# Patient Record
Sex: FEMALE | URBAN - METROPOLITAN AREA
[De-identification: names, ages, dates, MRNs, and addresses within clinical notes are randomized per-mention and may not be internally consistent; named-entity substitution may affect disease eponyms.]

---

## 2017-08-31 ENCOUNTER — TRANSFERRED RECORDS (OUTPATIENT)
Dept: HEALTH INFORMATION MANAGEMENT | Facility: CLINIC | Age: 32
End: 2017-08-31

## 2017-08-31 ENCOUNTER — MEDICAL CORRESPONDENCE (OUTPATIENT)
Dept: HEALTH INFORMATION MANAGEMENT | Facility: CLINIC | Age: 32
End: 2017-08-31

## 2017-10-04 ENCOUNTER — OFFICE VISIT (OUTPATIENT)
Dept: OBGYN | Facility: CLINIC | Age: 32
End: 2017-10-04
Attending: OBSTETRICS & GYNECOLOGY
Payer: COMMERCIAL

## 2017-10-04 VITALS — HEART RATE: 77 BPM | SYSTOLIC BLOOD PRESSURE: 93 MMHG | DIASTOLIC BLOOD PRESSURE: 59 MMHG | WEIGHT: 134.8 LBS

## 2017-10-04 DIAGNOSIS — N97.9 FEMALE INFERTILITY: Primary | ICD-10-CM

## 2017-10-04 NOTE — MR AVS SNAPSHOT
After Visit Summary   10/4/2017    Penny Engel    MRN: [id number]           Patient Information     Date Of Birth          1985        Visit Information        Provider Department      10/4/2017 1:15 PM Dorie Tabor MD [facility]        Today's Diagnoses     Female infertility    -  1      Care Instructions    1. Go to the lab on Monday 10/10 or Tuesday 10/11 to have your blood drawn. This is the progesterone test to check for ovulation.  2. Call the clinic on the first day of your next period to schedule the HSG test (test to evaluate the fallopian tubes and uterus)  3. Go to the lab on day 3-5 of your next period (the first day of bleeding is day 1) for the rest of the lab tests.  4. Have your partner complete a semen analysis- can be done through his doctor, or you can call us with his information and we can order it  5. Schedule return visit after all testing to go through results              Follow-ups after your visit        Future tests that were ordered for you today     Open Future Orders        Priority Expected Expires Ordered    Progesterone Routine  10/4/2018 10/4/2017    Follicle Stimulating Hormone Routine  10/4/2018 10/4/2017    Estradiol Routine  10/4/2018 10/4/2017    Anti-Mullerian hormone Routine  10/4/2018 10/4/2017    XR Hysterosalpingogram Routine 10/4/2017 10/4/2018 10/4/2017            Who to contact     Please call your clinic at [phone] to:    Ask questions about your health    Make or cancel appointments    Discuss your medicines    Learn about your test results    Speak to your doctor   If you have compliments or concerns about an experience at your clinic, or if you wish to file a complaint, please contact [facility] Patient Relations at [phone] or email us at [email or web address]         Additional Information About Your Visit        MyChart Information     Cuco is an  electronic gateway that provides easy, online access to your medical records. With Perfectore, you can request a clinic appointment, read your test results, renew a prescription or communicate with your care team.     To sign up for Perfectore visit the website at [email or web address]   You will be asked to enter the access code listed below, as well as some personal information. Please follow the directions to create your username and password.     Your access code is: [email or web address]  Expires: 2017  6:30 AM     Your access code will  in 90 days. If you need help or a new code, please contact your [facility] or call [phone] for assistance.        Care EveryWhere ID     This is your Care EveryWhere ID. This could be used by other organizations to access your [facility] medical records  [id number]        Your Vitals Were     Pulse Last Period                77 2017 (Exact Date)           Blood Pressure from Last 3 Encounters:   10/04/17 93/59    Weight from Last 3 Encounters:   10/04/17 61.1 kg (134 lb 12.8 oz)               Primary Care Provider    None Specified       No primary provider on file.        Equal Access to Services     [facility] HospitalAVELINO : Hadii az Ritchie, wapanchoda james, qaybta kaalmada jadyn, travis whaley . So [facility] [phone].    ATENCIÓN: Si habla español, tiene a szymanski disposición servicios gratuitos de asistencia lingüística. Llame al [phone].    We comply with applicable federal civil rights laws and [place] laws. We do not discriminate on the basis of race, color, national origin, age, disability, sex, sexual orientation, or gender identity.            Thank you!     Thank you for choosing [facility]  for your care. Our goal is always to provide you with excellent care. Hearing back from our patients is one way we can continue to improve our services. Please take a few minutes  to complete the written survey that you may receive in the mail after your visit with us. Thank you!             Your Updated Medication List - Protect others around you: Learn how to safely use, store and throw away your medicines at www.disposemymeds.org.      Notice  As of 10/4/2017  2:09 PM    You have not been prescribed any medications.

## 2017-10-04 NOTE — LETTER
10/4/2017       RE: Penny Engel  [address] [facility] [place] [address]     Dear Colleague,    Thank you for referring your patient, Penny Engel, to the [facility] at [facility]. Please see a copy of my visit note below.    [facility]     CC: Infertility    Subjective:  Penny Engel is a 32 year old G0 who presents to establish care for infertility. She has been trying to conceive with her partner for 2 years. She has been using a phone daly to track her periods and intercourse. She has not been using ovulation predictor kits. She has never been pregnant before, but is generally healthy with no other medical concerns. Her partner is healthy and does not have any children. Menstrual history is notable for 3 consecutive longer than normal cycles in 2016  (December-March, March-June, June-August), but has otherwise been regular every 30 days since menses, with no abnormal or concerning bleeding. Her cycle returning to regularity did not correspond with weight loss. PMH is notable for chlamydia infection in 2012 for which she received treatment. Family history is notable for 2 aunts with infertility, but no specific diagnosis known.    Contraception: none   Last Pap: 8/2017 at [facility], normal per patient     Menstrual History  Menses: age 11  LMP 9/18/2017  Regular every 30 days, 5 days of bleeding (1-2 days heavy bleeding 3-4 pads/day, not totally soaked)  PMS: cramps, headache, back pain, 1st day pretty severe and limits activity    GYN History  Infections: Chlamydia in 2012 - received treatment and has had no complications, and no history of PID  Contraception: tried the patch in 2004 or 2005 for 2 months, but did not like the hassle and has not been on other contraception since.  Surgeries: none    PMH  No history of thyroid, pituitary, or other endocrine disease. No diabetes. No  HTN.    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 She is generally healthy with an unremarkable medical history and no obvious cause of infertility, aside from a history of chlamydia. Differential at this point is quite broad and includes anovulatory disorders vs tube-associated infertility vs endocrine disorders vs uterine structural abnormalities vs male factor. Initial lab work-up will be initiated to tease out a possible etiology.    - Progesterone on cycle day 21  - FSH, estradiol on cycle day 3-5; AMH   - TSH with free T4  - XR Hysterosalpingogram at beginning of next cycle- pt to call clinic with next period to schedule  - Semen analysis for partner- explained we can perform this at [facility] lab if desired   - Start pre-edin vitamin    Follow up: In [facility] clinic when tests are completed.     I, Leland Chavis, MS3, am acting as the scribe for Dorie Tabor MD. All aspects of the exam and documentation were approved by the attending physician.    The above patient was seen and evaluated by me in conjunction with the medical student, who acted as my scribe for the above note. The documentation recorded by the scribe accurately reflects the services I personally performed and the decisions made by me.      Again, thank you for allowing me to participate in the care of your patient.      Sincerely,    Dorie Tabor MD

## 2017-10-04 NOTE — PATIENT INSTRUCTIONS
1. Go to the lab on Monday 10/10 or Tuesday 10/11 to have your blood drawn. This is the progesterone test to check for ovulation.  2. Call the clinic on the first day of your next period to schedule the HSG test (test to evaluate the fallopian tubes and uterus)  3. Go to the lab on day 3-5 of your next period (the first day of bleeding is day 1) for the rest of the lab tests.  4. Have your partner complete a semen analysis- can be done through his doctor, or you can call us with his information and we can order it  5. Schedule return visit after all testing to go through results

## 2017-10-04 NOTE — PROGRESS NOTES
[facility]     CC: Infertility    Subjective:  Penny Engel is a 32 year old G0 who presents to establish care for infertility. She has been trying to conceive with her partner for 2 years. She has been using a phone daly to track her periods and intercourse. She has not been using ovulation predictor kits. She has never been pregnant before, but is generally healthy with no other medical concerns. Her partner is healthy and does not have any children. Menstrual history is notable for 3 consecutive longer than normal cycles in 2016  (December-March, March-June, June-August), but has otherwise been regular every 30 days since menses, with no abnormal or concerning bleeding. Her cycle returning to regularity did not correspond with weight loss. PMH is notable for chlamydia infection in 2012 for which she received treatment. Family history is notable for 2 aunts with infertility, but no specific diagnosis known.    Contraception: none   Last Pap: 8/2017 at [facility], normal per patient     Menstrual History  Menses: age 11  LMP 9/18/2017  Regular every 30 days, 5 days of bleeding (1-2 days heavy bleeding 3-4 pads/day, not totally soaked)  PMS: cramps, headache, back pain, 1st day pretty severe and limits activity    GYN History  Infections: Chlamydia in 2012 - received treatment and has had no complications, and no history of PID  Contraception: tried the patch in 2004 or 2005 for 2 months, but did not like the hassle and has not been on other contraception since.  Surgeries: none    PMH  No history of thyroid, pituitary, or other endocrine disease. No diabetes. No HTN.    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 She is generally healthy with an unremarkable medical history and no obvious cause of infertility, aside from a history of chlamydia. Differential at this point is quite broad and includes anovulatory disorders vs tube-associated infertility vs endocrine disorders vs uterine structural abnormalities vs male factor. Initial lab work-up will be initiated to tease out a possible etiology.    - Progesterone on cycle day 21  - FSH, estradiol on cycle day 3-5; AMH   - TSH with free T4  - XR Hysterosalpingogram at beginning of next cycle- pt to call clinic with next period to schedule  - Semen analysis for partner- explained we can perform this at [facility] lab if desired   - Start pre-edin vitamin    Follow up: In [facility] clinic when tests are completed.     I, Leland Chavis, MS3, am acting as the scribe for Dorie Tabor MD. All aspects of the exam and documentation were approved by the attending physician.    The above patient was seen and evaluated by me in conjunction with the medical student, who acted as my scribe for the above note. The documentation recorded by the scribe accurately reflects the services I personally performed and the decisions made by me.    Dorie Tabor MD

## 2018-03-14 ENCOUNTER — HOSPITAL ENCOUNTER (OUTPATIENT)
Dept: GENERAL RADIOLOGY | Facility: CLINIC | Age: 33
Discharge: HOME | End: 2018-03-14
Attending: OBSTETRICS & GYNECOLOGY | Admitting: OBSTETRICS & GYNECOLOGY
Payer: COMMERCIAL

## 2018-03-14 ENCOUNTER — ALLIED HEALTH/NURSE VISIT (OUTPATIENT)
Dept: OBGYN | Facility: CLINIC | Age: 33
End: 2018-03-14
Payer: COMMERCIAL

## 2018-03-14 DIAGNOSIS — Z01.812 PRE-PROCEDURE LAB EXAM: Primary | ICD-10-CM

## 2018-03-14 DIAGNOSIS — N97.9 FEMALE INFERTILITY: ICD-10-CM

## 2018-03-14 LAB
HCG UR QL: NEGATIVE
INTERNAL QC OK POCT: YES

## 2018-03-14 PROCEDURE — 81025 URINE PREGNANCY TEST: CPT | Mod: ZF

## 2018-03-14 PROCEDURE — 58340 CATHETER FOR HYSTEROGRAPHY: CPT

## 2018-03-14 PROCEDURE — G0463 HOSPITAL OUTPT CLINIC VISIT: HCPCS | Mod: ZF

## 2018-03-14 PROCEDURE — 25000125 ZZHC RX 250: Performed by: OBSTETRICS & GYNECOLOGY

## 2018-03-14 PROCEDURE — 25500064 ZZH RX 255 OP 636: Performed by: OBSTETRICS & GYNECOLOGY

## 2018-03-14 PROCEDURE — 40000809 ZZH STATISTIC NO DOCUMENTATION TO SUPPORT CHARGE

## 2018-03-14 RX ORDER — IOPAMIDOL 510 MG/ML
50 INJECTION, SOLUTION INTRAVASCULAR ONCE
Status: COMPLETED | OUTPATIENT
Start: 2018-03-14 | End: 2018-03-14

## 2018-03-14 RX ADMIN — LIDOCAINE HYDROCHLORIDE 2 ML: 10 INJECTION, SOLUTION EPIDURAL; INFILTRATION; INTRACAUDAL; PERINEURAL at 10:40

## 2018-03-14 RX ADMIN — IOPAMIDOL 15 ML: 510 INJECTION, SOLUTION INTRAVASCULAR at 10:40

## 2018-03-14 NOTE — PROCEDURES
HSG Procedure    Indications: Assess tubal patency    Pre procedure Diagnosis: fertility testing  Post Procedure Diagnosis: Same    UPT: neg    Procedure: hysterosalpingogram  Anesthesia: 1 % lidocaine  EBL: minimal  Total Omnipaque:  cc    Procedure: With her permission, after explaining the procedure, the patient was placed in the dorsal lithotomy position.  A medium Graves speculum was inserted.  The cervix was visualized and cleansed with hibicleanse solution x 3.  1 cc 1% lidocaine was injected at 12 o' clock. The catheter was inserted to the internal os.  Because catheter passed easily, no tenaculum was placed.     HSG was completed with findings of normal shaped cavity and bilateral tubal fill and spill.      See radiology report for details.    All instruments were removed from the cervix.   Hemostasis attained with pressure .    Sherice Urban MD, FACOG  [facility]  OB/GYN    3/14/2018  10:35 AM

## 2018-03-21 ENCOUNTER — OFFICE VISIT (OUTPATIENT)
Dept: OBGYN | Facility: CLINIC | Age: 33
End: 2018-03-21
Attending: OBSTETRICS & GYNECOLOGY
Payer: COMMERCIAL

## 2018-03-21 VITALS
DIASTOLIC BLOOD PRESSURE: 63 MMHG | HEIGHT: 62 IN | BODY MASS INDEX: 23.92 KG/M2 | WEIGHT: 130 LBS | SYSTOLIC BLOOD PRESSURE: 101 MMHG | HEART RATE: 75 BPM

## 2018-03-21 DIAGNOSIS — Z31.41 FERTILITY TESTING: Primary | ICD-10-CM

## 2018-03-21 PROCEDURE — G0463 HOSPITAL OUTPT CLINIC VISIT: HCPCS | Mod: ZF

## 2018-03-21 RX ORDER — VITAMIN A ACETATE, BETA CAROTENE, ASCORBIC ACID, CHOLECALCIFEROL, .ALPHA.-TOCOPHEROL ACETATE, DL-, THIAMINE MONONITRATE, RIBOFLAVIN, NIACINAMIDE, PYRIDOXINE HYDROCHLORIDE, FOLIC ACID, CYANOCOBALAMIN, CALCIUM CARBONATE, FERROUS FUMARATE, ZINC OXIDE, CUPRIC OXIDE 3080; 12; 120; 400; 1; 1.84; 3; 20; 22; 920; 25; 200; 27; 10; 2 [IU]/1; UG/1; MG/1; [IU]/1; MG/1; MG/1; MG/1; MG/1; MG/1; [IU]/1; MG/1; MG/1; MG/1; MG/1; MG/1
TABLET, FILM COATED ORAL
COMMUNITY
Start: 2018-03-12

## 2018-03-21 RX ORDER — METRONIDAZOLE 500 MG/1
500 TABLET ORAL 2 TIMES DAILY
COMMUNITY

## 2018-03-21 ASSESSMENT — PAIN SCALES - GENERAL: PAINLEVEL: NO PAIN (0)

## 2018-03-21 NOTE — PATIENT INSTRUCTIONS
1. Go to the lab on day 21 (3/27 or 3/28) to have ONLY the progesterone test drawn  2. Go to the lab on day 3, 4 or 5 of your next cycle for the remainder of the labs. Day 1 is the first day of regular bleeding.

## 2018-03-21 NOTE — PROGRESS NOTES
"[facility] Return Visit    S: Penny Engel is a 32 year old G0 who presents to follow up infertility testing. She was last seen 10/2017, at which point she reported inability to conceive despite 2 years regular unprotected intercourse. She has not been tracking ovulation for the past few months as it causes her stress.     Since last visit, she had an HSG test which was normal. She has not had lab testing done yet. Her periods continue to be regular overall, though in Jan it was 2 weeks late.     O:    Vitals:    03/21/18 1503   BP: 101/63   Pulse: 75   Weight: 59 kg (130 lb)   Height: 1.562 m (5' 1.5\")     Gen: alert, oriented, NAD    A/P:  32 year old with primary infertility of 2+ years duration here to f/up.    - Reviewed normal HSG  - Explained again timing of labs- day 3 and day 21 progesterone. Patient states her LMP was 3/6 and she will have labs drawn this month.  - Partner has not yet had SA, but is open to it. Of note he has fathered a child previously.  - List of fertility clinics given today. I will call pt after labs back; if all normal would recommend establishing with fertility clinic.    Dorie Tabor MD      "

## 2018-03-21 NOTE — LETTER
"3/21/2018       RE: Pneny Engel  [address] [facility] [place] [address]     Dear Colleague,    Thank you for referring your patient, Penny Engel, to the [facility] at [facility]. Please see a copy of my visit note below.    S Return Visit    S: Penny Engel is a 32 year old G0 who presents to follow up infertility testing. She was last seen 10/2017, at which point she reported inability to conceive despite 2 years regular unprotected intercourse. She has not been tracking ovulation for the past few months as it causes her stress.     Since last visit, she had an HSG test which was normal. She has not had lab testing done yet. Her periods continue to be regular overall, though in Jan it was 2 weeks late.     O:    Vitals:    03/21/18 1503   BP: 101/63   Pulse: 75   Weight: 59 kg (130 lb)   Height: 1.562 m (5' 1.5\")     Gen: alert, oriented, NAD    A/P:  32 year old with primary infertility of 2+ years duration here to f/up.    - Reviewed normal HSG  - Explained again timing of labs- day 3 and day 21 progesterone. Patient states her LMP was 3/6 and she will have labs drawn this month.  - Partner has not yet had SA, but is open to it. Of note he has fathered a child previously.  - List of fertility clinics given today. I will call pt after labs back; if all normal would recommend establishing with fertility clinic.      Again, thank you for allowing me to participate in the care of your patient.      Sincerely,    Dorie Tabor MD      "

## 2018-03-27 DIAGNOSIS — N97.9 FEMALE INFERTILITY: ICD-10-CM

## 2018-03-27 LAB
ESTRADIOL SERPL-MCNC: 152 PG/ML
FSH SERPL-ACNC: 3.3 IU/L
PROGEST SERPL-MCNC: 15.1 NG/ML
TSH SERPL DL<=0.005 MIU/L-ACNC: 1.67 MU/L (ref 0.4–4)

## 2018-03-27 PROCEDURE — 82670 ASSAY OF TOTAL ESTRADIOL: CPT | Performed by: OBSTETRICS & GYNECOLOGY

## 2018-03-27 PROCEDURE — 83001 ASSAY OF GONADOTROPIN (FSH): CPT | Performed by: OBSTETRICS & GYNECOLOGY

## 2018-03-27 PROCEDURE — 84144 ASSAY OF PROGESTERONE: CPT | Performed by: OBSTETRICS & GYNECOLOGY

## 2018-03-27 PROCEDURE — 83520 IMMUNOASSAY QUANT NOS NONAB: CPT | Performed by: OBSTETRICS & GYNECOLOGY

## 2018-03-27 PROCEDURE — 84443 ASSAY THYROID STIM HORMONE: CPT | Performed by: OBSTETRICS & GYNECOLOGY

## 2018-03-27 PROCEDURE — 36415 COLL VENOUS BLD VENIPUNCTURE: CPT | Performed by: OBSTETRICS & GYNECOLOGY

## 2018-03-29 LAB — MIS SERPL-MCNC: 6.88 NG/ML (ref 0.18–11.71)

## 2018-11-17 NOTE — MR AVS SNAPSHOT
"              After Visit Summary   3/21/2018    Penny Engel    MRN: [id number]           Patient Information     Date Of Birth          1985        Visit Information        Provider Department      3/21/2018 2:45 PM Dorie Tabor MD [facility]        Care Instructions    1. Go to the lab on day 21 (3/27 or 3/28) to have ONLY the progesterone test drawn  2. Go to the lab on day 3, 4 or 5 of your next cycle for the remainder of the labs. Day 1 is the first day of regular bleeding.           Follow-ups after your visit        Who to contact     Please call your clinic at [phone] to:    Ask questions about your health    Make or cancel appointments    Discuss your medicines    Learn about your test results    Speak to your doctor            Additional Information About Your Visit        MyChart Information     [email or web address] is an electronic gateway that provides easy, online access to your medical records. With [email or web address], you can request a clinic appointment, read your test results, renew a prescription or communicate with your care team.     To sign up for [email or web address] visit the website at [email or web address]   You will be asked to enter the access code listed below, as well as some personal information. Please follow the directions to create your username and password.     Your access code is: [email or web address]  Expires: 2018  6:30 AM     Your access code will  in 90 days. If you need help or a new code, please contact your [facility] or call [phone] for assistance.        Care EveryWhere ID     This is your Care EveryWhere ID. This could be used by other organizations to access your [place] medical records  [id number]        Your Vitals Were     Pulse Height BMI (Body Mass Index)             75 1.562 m (5' 1.5\") 24.17 kg/m2          Blood Pressure from Last 3 Encounters:   18 101/63   10/04/17 93/59    Weight from Last " 3 Encounters:   03/21/18 59 kg (130 lb)   10/04/17 61.1 kg (134 lb 12.8 oz)              Today, you had the following     No orders found for display       Primary Care Provider    None Specified       No primary provider on file.        Equal Access to Services     NIRAV RANGEL : Hadii aad ku hadelanazuly [place], wapanchoda luqadaha, qajamesta kaalmada craigmalini, waxdanita juany schillingcristyjessenia echavarria. So [facility] [phone].    ATENCIÓN: Si habla español, tiene a szymanski disposición servicios gratuitos de asistencia lingüística. Llame al [phone].    We comply with applicable federal civil rights laws and [place] laws. We do not discriminate on the basis of race, color, national origin, age, disability, sex, sexual orientation, or gender identity.            Thank you!     Thank you for choosing [facility]  for your care. Our goal is always to provide you with excellent care. Hearing back from our patients is one way we can continue to improve our services. Please take a few minutes to complete the written survey that you may receive in the mail after your visit with us. Thank you!             Your Updated Medication List - Protect others around you: Learn how to safely use, store and throw away your medicines at www.disposemymeds.org.          This list is accurate as of 3/21/18  3:21 PM.  Always use your most recent med list.                   Brand Name Dispense Instructions for use Diagnosis    metroNIDAZOLE 500 MG tablet    FLAGYL     Take 500 mg by mouth 2 times daily        PRENATAL PLUS 27-1 MG Tabs              91

## 2023-04-25 ENCOUNTER — APPOINTMENT (OUTPATIENT)
Dept: URBAN - METROPOLITAN AREA CLINIC 254 | Age: 38
Setting detail: DERMATOLOGY
End: 2023-04-26

## 2023-04-25 VITALS — HEIGHT: 61 IN | RESPIRATION RATE: 16 BRPM

## 2023-04-25 DIAGNOSIS — L82.1 OTHER SEBORRHEIC KERATOSIS: ICD-10-CM

## 2023-04-25 PROCEDURE — OTHER COUNSELING: OTHER

## 2023-04-25 PROCEDURE — 99202 OFFICE O/P NEW SF 15 MIN: CPT

## 2023-04-25 ASSESSMENT — LOCATION ZONE DERM: LOCATION ZONE: FACE

## 2023-04-25 ASSESSMENT — LOCATION DETAILED DESCRIPTION DERM: LOCATION DETAILED: LEFT SUPERIOR LATERAL MALAR CHEEK

## 2023-04-25 ASSESSMENT — LOCATION SIMPLE DESCRIPTION DERM: LOCATION SIMPLE: LEFT CHEEK

## 2023-04-25 NOTE — PROCEDURE: COUNSELING
Patient Specific Counseling (Will Not Stick From Patient To Patient): Informed pt that lesion can be treated with ln2. Pt defers and states not bothersome. \\nReassured pt that lesion is benign but has the option to do biopsy for final diagnosis. Pt declines and expressed understanding.
Detail Level: Detailed

## (undated) RX ORDER — LIDOCAINE HYDROCHLORIDE 10 MG/ML
INJECTION, SOLUTION EPIDURAL; INFILTRATION; INTRACAUDAL; PERINEURAL
Status: DISPENSED
Start: 2018-03-14